# Patient Record
Sex: MALE | Race: WHITE | NOT HISPANIC OR LATINO | Employment: FULL TIME | URBAN - METROPOLITAN AREA
[De-identification: names, ages, dates, MRNs, and addresses within clinical notes are randomized per-mention and may not be internally consistent; named-entity substitution may affect disease eponyms.]

---

## 2017-10-29 ENCOUNTER — OFFICE VISIT (OUTPATIENT)
Dept: URGENT CARE | Facility: CLINIC | Age: 27
End: 2017-10-29
Payer: COMMERCIAL

## 2017-10-29 DIAGNOSIS — J01.90 ACUTE SINUSITIS: ICD-10-CM

## 2017-10-29 DIAGNOSIS — R30.0 DYSURIA: ICD-10-CM

## 2017-10-29 PROCEDURE — G0382 LEV 3 HOSP TYPE B ED VISIT: HCPCS

## 2018-01-18 NOTE — PROGRESS NOTES
Assessment    1  Acute sinusitis (461 9) (J01 90)    Plan  Acute sinusitis    · Amoxicillin-Pot Clavulanate 875-125 MG Oral Tablet; TAKE 1 TABLET EVERY 12  HOURS UNTIL GONE   · Lidocaine Viscous 2 % Mouth/Throat Solution; USE 5ML EVERY 2 HOURS AS  NEEDED    Discussion/Summary  Discussion Summary:   1) frequent nasal saline rinses  2) take antibiotic as prescribe  3) lidocaine rinse for sore throat as prescribed  Chief Complaint    1  Cold Symptoms    History of Present Illness  HPI: 32yo M p/w nasal congestion, facial pain, sore throat, and cough x 1 week  Pt denies n/v/d, sob/wheezing, fever/chills  Review of Systems  Focused-Male:   Constitutional: no fever or chills, feels well, no tiredness, no recent weight loss or weight gain  ENT: sore throat and nasal discharge, but no earache, no nosebleeds, no hearing loss and no hoarseness  Cardiovascular: no complaints of slow or fast heart rate, no chest pain, no palpitations, no leg claudication or lower extremity edema  Respiratory: cough, but no shortness of breath, no orthopnea, no wheezing, no shortness of breath during exertion and no PND  Gastrointestinal: no complaints of abdominal pain, no constipation, no nausea or vomiting, no diarrhea or bloody stools  Genitourinary: no complaints of dysuria or incontinence, no hesitancy, no nocturia, no genital lesion, no inadequacy of penile erection  Musculoskeletal: no complaints of arthralgia, no myalgia, no joint swelling or stiffness, no limb pain or swelling  Integumentary: no complaints of skin rash or lesion, no itching or dry skin, no skin wounds  Neurological: no complaints of headache, no confusion, no numbness or tingling, no dizziness or fainting  ROS Reviewed:   ROS reviewed  Active Problems    1  Acute sinusitis (461 9) (J01 90)    Past Medical History    1   History of gastroesophageal reflux (GERD) (V12 79) (Z87 19)  Active Problems And Past Medical History Reviewed: The active problems and past medical history were reviewed and updated today  Family History  Family History Reviewed: The family history was reviewed and updated today  Social History  Social History Reviewed: The social history was reviewed and is unchanged  Surgical History  Surgical History Reviewed: The surgical history was reviewed and updated today  Current Meds   1  NexIUM 20 MG Oral Capsule Delayed Release; Therapy: ([id number]) to Recorded  Medication List Reviewed: The medication list was reviewed and updated today  Allergies    1  No Known Drug Allergies    Vitals  Signs   Recorded: [id number] 08:24AM   Temperature: 97 5 F, Tympanic  Heart Rate: 62  Respiration: 20  Systolic: 569  Diastolic: 71  Height: 5 ft 7 in  Weight: 250 lb   BMI Calculated: 39 16  BSA Calculated: 2 22  O2 Saturation: 95  Pain Scale: 7    Physical Exam    Constitutional   General appearance: No acute distress, well appearing and well nourished  Eyes   Conjunctiva and lids: No swelling, erythema, or discharge  Pupils and irises: Equal, round and reactive to light  Ears, Nose, Mouth, and Throat   External inspection of ears and nose: Normal     Otoscopic examination: Tympanic membrance translucent with normal light reflex  Canals patent without erythema  Nasal mucosa, septum, and turbinates: Abnormal   normal nasal septum, no intranasal masses or polyps and normal nasal turbinates  There was a purulent discharge from both nares  The bilateral nasal mucosa was edematous  Oropharynx: Normal with no erythema, edema, exudate or lesions  Pulmonary   Respiratory effort: Abnormal   Respiratory rate: normal  Assessment of respiratory effort revealed normal rhythm and effort  Respiratory Findings: dry cough  Auscultation of lungs: Clear to auscultation  no rales or crackles were heard bilaterally  no rhonchi  no friction rub  no wheezing  no diminished breath sounds  Cardiovascular   Palpation of heart: Normal PMI, no thrills  Auscultation of heart: Normal rate and rhythm, normal S1 and S2, without murmurs  Abdomen   Abdomen: Non-tender, no masses  Liver and spleen: No hepatomegaly or splenomegaly  Lymphatic   Palpation of lymph nodes in neck: Abnormal   bilateral anterior cervical node enlargement, but no posterior cervical node enlargement  Skin   Skin and subcutaneous tissue: Normal without rashes or lesions      Psychiatric   Orientation to person, place and time: Normal     Mood and affect: Normal        Signatures   Electronically signed by : ELIZABETH Maier; Oct 29 2017  8:39AM EST                       (Author)    Electronically signed by : THERESA Connor ; Oct 29 2017  8:49AM EST                       (Co-author)

## 2020-10-27 ENCOUNTER — NURSE TRIAGE (OUTPATIENT)
Dept: OTHER | Facility: OTHER | Age: 30
End: 2020-10-27

## 2020-10-27 DIAGNOSIS — Z20.828 SARS-ASSOCIATED CORONAVIRUS EXPOSURE: Primary | ICD-10-CM

## 2020-10-28 DIAGNOSIS — Z20.828 SARS-ASSOCIATED CORONAVIRUS EXPOSURE: ICD-10-CM

## 2020-10-28 PROCEDURE — U0003 INFECTIOUS AGENT DETECTION BY NUCLEIC ACID (DNA OR RNA); SEVERE ACUTE RESPIRATORY SYNDROME CORONAVIRUS 2 (SARS-COV-2) (CORONAVIRUS DISEASE [COVID-19]), AMPLIFIED PROBE TECHNIQUE, MAKING USE OF HIGH THROUGHPUT TECHNOLOGIES AS DESCRIBED BY CMS-2020-01-R: HCPCS | Performed by: FAMILY MEDICINE

## 2020-10-29 LAB — SARS-COV-2 RNA SPEC QL NAA+PROBE: NOT DETECTED

## 2021-05-04 ENCOUNTER — DOCTOR'S OFFICE (OUTPATIENT)
Dept: URBAN - NONMETROPOLITAN AREA CLINIC 1 | Facility: CLINIC | Age: 31
Setting detail: OPHTHALMOLOGY
End: 2021-05-04

## 2021-05-04 DIAGNOSIS — H52.03: ICD-10-CM

## 2021-05-04 DIAGNOSIS — G43.109: ICD-10-CM

## 2021-05-04 DIAGNOSIS — H43.393: ICD-10-CM

## 2021-05-04 DIAGNOSIS — H52.223: ICD-10-CM

## 2021-05-04 PROCEDURE — SELFPAYVIS VISION VISIT SELF PAY: Performed by: OPTOMETRIST

## 2021-05-04 ASSESSMENT — TONOMETRY
OS_IOP_MMHG: 17
OD_IOP_MMHG: 17

## 2021-05-04 ASSESSMENT — CONFRONTATIONAL VISUAL FIELD TEST (CVF)
OS_FINDINGS: FULL
OD_FINDINGS: FULL

## 2021-05-04 ASSESSMENT — VISUAL ACUITY
OS_BCVA: 20/25
OD_BCVA: 20/25-1

## 2021-05-04 ASSESSMENT — REFRACTION_MANIFEST
OD_CYLINDER: -0.50
OS_AXIS: 165
OS_VA1: 20/20
OD_SPHERE: PLANO
OS_VA2: 20/20
OD_AXIS: 005
OD_VA2: 20/20
OS_CYLINDER: -0.50
OS_SPHERE: PLANO
OD_VA1: 20/20

## 2021-05-04 ASSESSMENT — REFRACTION_AUTOREFRACTION
OS_CYLINDER: -0.50
OD_SPHERE: 0.00
OD_AXIS: 005
OS_AXIS: 166
OD_CYLINDER: -0.25
OS_SPHERE: +0.50

## 2021-05-04 ASSESSMENT — SPHEQUIV_DERIVED
OS_SPHEQUIV: 0.25
OD_SPHEQUIV: -0.125

## 2022-11-08 ENCOUNTER — DOCTOR'S OFFICE (OUTPATIENT)
Dept: URBAN - NONMETROPOLITAN AREA CLINIC 1 | Facility: CLINIC | Age: 32
Setting detail: OPHTHALMOLOGY
End: 2022-11-08
Payer: COMMERCIAL

## 2022-11-08 ENCOUNTER — OPTICAL OFFICE (OUTPATIENT)
Dept: URBAN - NONMETROPOLITAN AREA CLINIC 4 | Facility: CLINIC | Age: 32
Setting detail: OPHTHALMOLOGY
End: 2022-11-08
Payer: COMMERCIAL

## 2022-11-08 DIAGNOSIS — H52.223: ICD-10-CM

## 2022-11-08 PROCEDURE — V2020 VISION SVCS FRAMES PURCHASES: HCPCS | Performed by: OPTOMETRIST

## 2022-11-08 PROCEDURE — V2103 SPHEROCYLINDR 4.00D/12-2.00D: HCPCS | Performed by: OPTOMETRIST

## 2022-11-08 PROCEDURE — 92014 COMPRE OPH EXAM EST PT 1/>: CPT | Performed by: OPTOMETRIST

## 2022-11-08 ASSESSMENT — REFRACTION_MANIFEST
OS_VA1: 20/20
OD_VA2: 20/20
OS_VA2: 20/20
OS_SPHERE: PLANO
OD_VA1: 20/20
OD_SPHERE: -0.25
OD_CYLINDER: -0.75
OS_CYLINDER: -0.50
OS_AXIS: 165
OD_AXIS: 005

## 2022-11-08 ASSESSMENT — REFRACTION_AUTOREFRACTION
OS_CYLINDER: -0.50
OS_AXIS: 169
OD_SPHERE: -0.50
OS_SPHERE: -0.25
OD_CYLINDER: -1.00
OD_AXIS: 178

## 2022-11-08 ASSESSMENT — TONOMETRY
OD_IOP_MMHG: 16
OS_IOP_MMHG: 16

## 2022-11-08 ASSESSMENT — SPHEQUIV_DERIVED
OD_SPHEQUIV: -0.625
OD_SPHEQUIV: -1
OS_SPHEQUIV: -0.5

## 2022-11-08 ASSESSMENT — CONFRONTATIONAL VISUAL FIELD TEST (CVF)
OD_FINDINGS: FULL
OS_FINDINGS: FULL

## 2022-11-08 ASSESSMENT — VISUAL ACUITY
OD_BCVA: 20/20
OS_BCVA: 20/20-1

## 2025-02-10 ENCOUNTER — OFFICE VISIT (OUTPATIENT)
Dept: URGENT CARE | Facility: CLINIC | Age: 35
End: 2025-02-10

## 2025-02-10 VITALS
HEIGHT: 67 IN | WEIGHT: 206.6 LBS | BODY MASS INDEX: 32.43 KG/M2 | OXYGEN SATURATION: 98 % | HEART RATE: 83 BPM | RESPIRATION RATE: 16 BRPM | SYSTOLIC BLOOD PRESSURE: 129 MMHG | TEMPERATURE: 97.8 F | DIASTOLIC BLOOD PRESSURE: 84 MMHG

## 2025-02-10 DIAGNOSIS — R11.0 NAUSEA: Primary | ICD-10-CM

## 2025-02-10 DIAGNOSIS — R19.7 DIARRHEA, UNSPECIFIED TYPE: ICD-10-CM

## 2025-02-10 PROCEDURE — 99213 OFFICE O/P EST LOW 20 MIN: CPT

## 2025-02-10 RX ORDER — ONDANSETRON 4 MG/1
4 TABLET, ORALLY DISINTEGRATING ORAL ONCE
Status: COMPLETED | OUTPATIENT
Start: 2025-02-10 | End: 2025-02-10

## 2025-02-10 RX ORDER — ONDANSETRON 4 MG/1
4 TABLET, FILM COATED ORAL EVERY 8 HOURS PRN
Qty: 20 TABLET | Refills: 0 | Status: SHIPPED | OUTPATIENT
Start: 2025-02-10

## 2025-02-10 RX ADMIN — ONDANSETRON 4 MG: 4 TABLET, ORALLY DISINTEGRATING ORAL at 15:46

## 2025-02-10 NOTE — PROGRESS NOTES
"Name: Federico Li III      : 1990      MRN: [id number]  Encounter Provider: Hazel Bowles PA-C  Encounter Date: 2/10/2025   Encounter department: [facility]  :  Assessment & Plan  Nausea    Orders:    ondansetron (ZOFRAN-ODT) dispersible tablet 4 mg    ondansetron (ZOFRAN) 4 mg tablet; Take 1 tablet (4 mg total) by mouth every 8 (eight) hours as needed for nausea or vomiting    Diarrhea, unspecified type    Orders:    ondansetron (ZOFRAN) 4 mg tablet; Take 1 tablet (4 mg total) by mouth every 8 (eight) hours as needed for nausea or vomiting    Dose of zofran given at visit to control nausea during exam. Pt tolerated well and had significantly improved symptoms.  Sent zofran script for at home as well to assist with keeping bland diet and fluids down. Advised to continue fluids with electrolytes.  Defers covid/flu testing.  Pt understands return precautions for worsening or persistent symptoms.    History of Present Illness   HPI  Federico Li III is a 34 y.o. male who presents with vomiting, fever, body aches, weakness and diarrhea started Saturday           Review of Systems   Constitutional:  Negative for fever.   Gastrointestinal:  Positive for diarrhea, nausea and vomiting.   Musculoskeletal:  Positive for arthralgias and myalgias.          Objective   /84   Pulse 83   Temp 97.8 °F (36.6 °C)   Resp 16   Ht 5' 7\" (1.702 m)   Wt 93.7 kg (206 lb 9.6 oz)   SpO2 98%   BMI 32.36 kg/m²      Physical Exam  Constitutional:       Appearance: He is ill-appearing.   HENT:      Head: Normocephalic and atraumatic.   Cardiovascular:      Rate and Rhythm: Normal rate.   Pulmonary:      Effort: Pulmonary effort is normal.   Abdominal:      General: Abdomen is flat.      Palpations: Abdomen is soft.      Tenderness: There is abdominal tenderness.      Comments: Tenderness in the epigastric area. No tenderness in the lower quadrants. Increased bowel sounds.   Neurological:    "   Mental Status: He is alert.

## 2025-02-10 NOTE — LETTER
February 10, 2025     Patient: Federico Li III   YOB: 1990   Date of Visit: 2/10/2025       To Whom it May Concern:    Federico Li was seen in my clinic on 2/10/2025. He may return to work on 2/12/25 .    If you have any questions or concerns, please don't hesitate to call.         Sincerely,          Hazel Bowles PA-C        CC: No Recipients

## 2025-02-11 ENCOUNTER — HOSPITAL ENCOUNTER (EMERGENCY)
Facility: HOSPITAL | Age: 35
Discharge: HOME | End: 2025-02-11
Attending: EMERGENCY MEDICINE
Payer: COMMERCIAL

## 2025-02-11 ENCOUNTER — APPOINTMENT (EMERGENCY)
Dept: RADIOLOGY | Facility: HOSPITAL | Age: 35
End: 2025-02-11
Payer: COMMERCIAL

## 2025-02-11 VITALS
OXYGEN SATURATION: 96 % | DIASTOLIC BLOOD PRESSURE: 80 MMHG | RESPIRATION RATE: 22 BRPM | SYSTOLIC BLOOD PRESSURE: 123 MMHG | HEART RATE: 58 BPM | TEMPERATURE: 98.6 F

## 2025-02-11 DIAGNOSIS — R07.9 CHEST PAIN: Primary | ICD-10-CM

## 2025-02-11 LAB
ALBUMIN SERPL BCG-MCNC: 4.4 G/DL (ref 3.5–5)
ALP SERPL-CCNC: 56 U/L (ref 34–104)
ALT SERPL W P-5'-P-CCNC: 32 U/L (ref 7–52)
ANION GAP SERPL CALCULATED.3IONS-SCNC: 6 MMOL/L (ref 4–13)
AST SERPL W P-5'-P-CCNC: 37 U/L (ref 13–39)
ATRIAL RATE: 66 BPM
BASOPHILS # BLD AUTO: 0.03 THOUSANDS/ΜL (ref 0–0.1)
BASOPHILS NFR BLD AUTO: 0 % (ref 0–1)
BILIRUB SERPL-MCNC: 0.62 MG/DL (ref 0.2–1)
BUN SERPL-MCNC: 15 MG/DL (ref 5–25)
CALCIUM SERPL-MCNC: 9.2 MG/DL (ref 8.4–10.2)
CARDIAC TROPONIN I PNL SERPL HS: 5 NG/L (ref ?–50)
CHLORIDE SERPL-SCNC: 101 MMOL/L (ref 96–108)
CO2 SERPL-SCNC: 29 MMOL/L (ref 21–32)
CREAT SERPL-MCNC: 1.24 MG/DL (ref 0.6–1.3)
EOSINOPHIL # BLD AUTO: 0.19 THOUSAND/ΜL (ref 0–0.61)
EOSINOPHIL NFR BLD AUTO: 2 % (ref 0–6)
ERYTHROCYTE [DISTWIDTH] IN BLOOD BY AUTOMATED COUNT: 12.1 % (ref 11.6–15.1)
GFR SERPL CREATININE-BSD FRML MDRD: 74 ML/MIN/1.73SQ M
GLUCOSE SERPL-MCNC: 77 MG/DL (ref 65–140)
HCT VFR BLD AUTO: 47.3 % (ref 36.5–49.3)
HGB BLD-MCNC: 16.4 G/DL (ref 12–17)
IMM GRANULOCYTES # BLD AUTO: 0.02 THOUSAND/UL (ref 0–0.2)
IMM GRANULOCYTES NFR BLD AUTO: 0 % (ref 0–2)
LYMPHOCYTES # BLD AUTO: 2.59 THOUSANDS/ΜL (ref 0.6–4.47)
LYMPHOCYTES NFR BLD AUTO: 31 % (ref 14–44)
MCH RBC QN AUTO: 28.9 PG (ref 26.8–34.3)
MCHC RBC AUTO-ENTMCNC: 34.7 G/DL (ref 31.4–37.4)
MCV RBC AUTO: 83 FL (ref 82–98)
MONOCYTES # BLD AUTO: 0.96 THOUSAND/ΜL (ref 0.17–1.22)
MONOCYTES NFR BLD AUTO: 11 % (ref 4–12)
NEUTROPHILS # BLD AUTO: 4.68 THOUSANDS/ΜL (ref 1.85–7.62)
NEUTS SEG NFR BLD AUTO: 56 % (ref 43–75)
NRBC BLD AUTO-RTO: 0 /100 WBCS
P AXIS: 51 DEGREES
PLATELET # BLD AUTO: 262 THOUSANDS/UL (ref 149–390)
PMV BLD AUTO: 10 FL (ref 8.9–12.7)
POTASSIUM SERPL-SCNC: 3.3 MMOL/L (ref 3.5–5.3)
PR INTERVAL: 152 MS
PROT SERPL-MCNC: 7.4 G/DL (ref 6.4–8.4)
QRS AXIS: 75 DEGREES
QRSD INTERVAL: 94 MS
QT INTERVAL: 392 MS
QTC INTERVAL: 410 MS
RBC # BLD AUTO: 5.67 MILLION/UL (ref 3.88–5.62)
SODIUM SERPL-SCNC: 136 MMOL/L (ref 135–147)
T WAVE AXIS: 56 DEGREES
VENTRICULAR RATE: 66 BPM
WBC # BLD AUTO: 8.47 THOUSAND/UL (ref 4.31–10.16)

## 2025-02-11 PROCEDURE — 99284 EMERGENCY DEPT VISIT MOD MDM: CPT | Performed by: EMERGENCY MEDICINE

## 2025-02-11 PROCEDURE — 84484 ASSAY OF TROPONIN QUANT: CPT | Performed by: EMERGENCY MEDICINE

## 2025-02-11 PROCEDURE — 93005 ELECTROCARDIOGRAM TRACING: CPT

## 2025-02-11 PROCEDURE — 36415 COLL VENOUS BLD VENIPUNCTURE: CPT | Performed by: EMERGENCY MEDICINE

## 2025-02-11 PROCEDURE — 80053 COMPREHEN METABOLIC PANEL: CPT | Performed by: EMERGENCY MEDICINE

## 2025-02-11 PROCEDURE — 71045 X-RAY EXAM CHEST 1 VIEW: CPT

## 2025-02-11 PROCEDURE — 93010 ELECTROCARDIOGRAM REPORT: CPT | Performed by: INTERNAL MEDICINE

## 2025-02-11 PROCEDURE — 96374 THER/PROPH/DIAG INJ IV PUSH: CPT

## 2025-02-11 PROCEDURE — 99285 EMERGENCY DEPT VISIT HI MDM: CPT

## 2025-02-11 PROCEDURE — 85025 COMPLETE CBC W/AUTO DIFF WBC: CPT | Performed by: EMERGENCY MEDICINE

## 2025-02-11 RX ORDER — POTASSIUM CHLORIDE 1500 MG/1
40 TABLET, EXTENDED RELEASE ORAL ONCE
Status: COMPLETED | OUTPATIENT
Start: 2025-02-11 | End: 2025-02-11

## 2025-02-11 RX ORDER — FAMOTIDINE 10 MG/ML
20 INJECTION, SOLUTION INTRAVENOUS ONCE
Status: COMPLETED | OUTPATIENT
Start: 2025-02-11 | End: 2025-02-11

## 2025-02-11 RX ADMIN — FAMOTIDINE 20 MG: 10 INJECTION, SOLUTION INTRAVENOUS at 14:43

## 2025-02-11 RX ADMIN — POTASSIUM CHLORIDE 40 MEQ: 1500 TABLET, EXTENDED RELEASE ORAL at 15:28

## 2025-02-11 NOTE — Clinical Note
Federico Li was seen and treated in our emergency department on 2/11/2025.                Diagnosis:     Federico  may return to work on return date.    He may return on this date: 02/12/2025         If you have any questions or concerns, please don't hesitate to call.      Robert Walker MD    ______________________________           _______________          _______________  Hospital Representative                              Date                                Time

## 2025-02-11 NOTE — ED PROVIDER NOTES
Time reflects when diagnosis was documented in both MDM as applicable and the Disposition within this note       Time User Action Codes Description Comment    2/11/2025  3:23 PM Robert Walker Add [R07.9] Chest pain           ED Disposition       ED Disposition   Discharge    Condition   Stable    Date/Time   Tue Feb 11, 2025  3:23 PM    Comment   Federico Li III discharge to home/self care.                   Assessment & Plan       Medical Decision Making  1432: Patient appears well, vital signs reviewed.   Pt states he just wanted to get a work note from the , but is agreeable to emergent evaluation.  Placed on monitor.  Patient with atypical chest pain, relates it to be possible esophagitis.  Patient sent from urgent care for further evaluation.  Plan to complete basic labs including cardiac enzymes.  Check chest x-ray.  I will give Pepcid for his discomfort and reevaluate.     1524: Chest x-ray and labs reviewed.  Patient has remained stable throughout ED course.  Stable for discharge.    Amount and/or Complexity of Data Reviewed  Labs: ordered.  Radiology: ordered.     Details: Chest x-ray--no acute pathology  ECG/medicine tests: ordered and independent interpretation performed.     Details: Normal sinus rhythm 66 bpm, no acute ischemia.    Risk  Prescription drug management.             Medications   potassium chloride (Klor-Con M20) CR tablet 40 mEq (has no administration in time range)   Famotidine (PF) (PEPCID) injection 20 mg (20 mg Intravenous Given 2/11/25 1443)       ED Risk Strat Scores      HEART Risk Score      Flowsheet Row Most Recent Value   Heart Score Risk Calculator    History 0 Filed at: 02/11/2025 1522   ECG 0 Filed at: 02/11/2025 1522   Age 0 Filed at: 02/11/2025 1522   Risk Factors 0 Filed at: 02/11/2025 1522   Troponin 0 Filed at: 02/11/2025 1522   HEART Score 0 Filed at: 02/11/2025 1522                            SBIRT 22yo+      Flowsheet Row Most Recent Value   Initial Alcohol  Screen: US AUDIT-C     1. How often do you have a drink containing alcohol? 0 Filed at: 02/11/2025 1451   2. How many drinks containing alcohol do you have on a typical day you are drinking?  0 Filed at: 02/11/2025 1451   3a. Male UNDER 65: How often do you have five or more drinks on one occasion? 0 Filed at: 02/11/2025 1451   3b. FEMALE Any Age, or MALE 65+: How often do you have 4 or more drinks on one occassion? 0 Filed at: 02/11/2025 1451   Audit-C Score 0 Filed at: 02/11/2025 1451   CHRISTIANE: How many times in the past year have you...    Used an illegal drug or used a prescription medication for non-medical reasons? Never Filed at: 02/11/2025 1451                            History of Present Illness       Chief Complaint   Patient presents with    Shortness of Breath     Pt c/o heartburn with SOB and a tightness in his esophagus. Reports recent dx of norovirus       History reviewed. No pertinent past medical history.   History reviewed. No pertinent surgical history.   Family History   Problem Relation Age of Onset    No Known Problems Mother     Heart attack Father       Social History     Tobacco Use    Smoking status: Never    Smokeless tobacco: Never   Vaping Use    Vaping status: Every Day    Substances: Nicotine, Flavoring   Substance Use Topics    Alcohol use: Not Currently    Drug use: Not Currently      E-Cigarette/Vaping    E-Cigarette Use Current Every Day User       E-Cigarette/Vaping Substances    Nicotine Yes     THC No     CBD No     Flavoring Yes       I have reviewed and agree with the history as documented.       History provided by:  Medical records and patient  Chest Pain  Pain location:  Substernal area  Pain quality: burning    Pain radiates to:  Does not radiate  Pain radiates to the back: no    Pain severity:  Moderate  Onset quality:  Gradual  Duration:  1 day  Timing:  Intermittent  Progression:  Waxing and waning  Chronicity:  New  Context comment:  History of GERD, recently diagnosed  with norovirus, had nausea vomiting diarrhea 5 days ago, slowly improved.  Since last night has had some increased belching and chest burning  Relieved by:  Nothing  Worsened by:  Nothing tried  Ineffective treatments:  None tried  Associated symptoms: nausea and vomiting    Associated symptoms: no abdominal pain, no altered mental status, no anorexia, no anxiety, no back pain, no claudication, no cough, no diaphoresis, no dizziness, no dysphagia, no fatigue, no fever, no headache, no heartburn, no lower extremity edema, no near-syncope, no numbness, no orthopnea, no palpitations, no PND, no shortness of breath, no syncope and no weakness    Risk factors: no prior DVT/PE        Review of Systems   Constitutional:  Negative for appetite change, chills, diaphoresis, fatigue and fever.   HENT:  Negative for ear pain, rhinorrhea, sore throat and trouble swallowing.    Eyes:  Negative for pain, discharge and visual disturbance.   Respiratory:  Negative for cough, chest tightness and shortness of breath.    Cardiovascular:  Positive for chest pain. Negative for palpitations, orthopnea, claudication, syncope, PND and near-syncope.   Gastrointestinal:  Positive for diarrhea, nausea and vomiting. Negative for abdominal pain, anorexia and heartburn.   Endocrine: Negative for polydipsia, polyphagia and polyuria.   Genitourinary:  Negative for difficulty urinating, dysuria, hematuria and testicular pain.   Musculoskeletal:  Negative for arthralgias and back pain.   Skin:  Negative for color change and rash.   Allergic/Immunologic: Negative for immunocompromised state.   Neurological:  Negative for dizziness, seizures, syncope, weakness, numbness and headaches.   Hematological:  Negative for adenopathy.   Psychiatric/Behavioral:  Negative for confusion and dysphoric mood.    All other systems reviewed and are negative.          Objective       ED Triage Vitals [02/11/25 1440]   Temperature Pulse Blood Pressure Respirations SpO2  Patient Position - Orthostatic VS   98.6 °F (37 °C) 65 (!) 158/106 20 99 % --      Temp Source Heart Rate Source BP Location FiO2 (%) Pain Score    Temporal Monitor Right arm -- --      Vitals      Date and Time Temp Pulse SpO2 Resp BP Pain Score FACES Pain Rating User   02/11/25 1500 -- 60 97 % 21 132/78 -- -- SS   02/11/25 1445 -- 58 96 % 19 132/77 -- -- SS   02/11/25 1440 98.6 °F (37 °C) 65 99 % 20 158/106 -- -- SS            Physical Exam  Vitals and nursing note reviewed.   Constitutional:       General: He is not in acute distress.     Appearance: Normal appearance. He is not ill-appearing, toxic-appearing or diaphoretic.   HENT:      Head: Normocephalic and atraumatic.      Nose: Nose normal. No congestion or rhinorrhea.      Mouth/Throat:      Mouth: Mucous membranes are moist.      Pharynx: Oropharynx is clear. No oropharyngeal exudate or posterior oropharyngeal erythema.   Eyes:      General:         Right eye: No discharge.         Left eye: No discharge.   Cardiovascular:      Rate and Rhythm: Normal rate and regular rhythm.      Pulses: Normal pulses.      Heart sounds: Normal heart sounds. No murmur heard.     No gallop.   Pulmonary:      Effort: Pulmonary effort is normal. No respiratory distress.      Breath sounds: Normal breath sounds. No stridor. No wheezing, rhonchi or rales.   Chest:      Chest wall: No tenderness.   Abdominal:      General: Bowel sounds are normal. There is no distension.      Palpations: Abdomen is soft. There is no mass.      Tenderness: There is no abdominal tenderness. There is no right CVA tenderness, left CVA tenderness, guarding or rebound.      Hernia: No hernia is present.   Musculoskeletal:         General: Normal range of motion.      Cervical back: Normal range of motion and neck supple.   Skin:     General: Skin is warm and dry.      Capillary Refill: Capillary refill takes less than 2 seconds.   Neurological:      General: No focal deficit present.      Mental  Status: He is alert and oriented to person, place, and time.      Cranial Nerves: No cranial nerve deficit.      Sensory: No sensory deficit.      Motor: No weakness.      Coordination: Coordination normal.      Gait: Gait normal.      Deep Tendon Reflexes: Reflexes normal.   Psychiatric:         Mood and Affect: Mood normal.         Behavior: Behavior normal.         Thought Content: Thought content normal.         Judgment: Judgment normal.         Results Reviewed       Procedure Component Value Units Date/Time    HS Troponin 0hr (reflex protocol) [[id number]]  (Normal) Collected: 02/11/25 1441    Lab Status: Final result Specimen: Blood from Arm, Right Updated: 02/11/25 1515     hs TnI 0hr 5 ng/L     Comprehensive metabolic panel [[id number]]  (Abnormal) Collected: 02/11/25 1441    Lab Status: Final result Specimen: Blood from Arm, Right Updated: 02/11/25 1509     Sodium 136 mmol/L      Potassium 3.3 mmol/L      Chloride 101 mmol/L      CO2 29 mmol/L      ANION GAP 6 mmol/L      BUN 15 mg/dL      Creatinine 1.24 mg/dL      Glucose 77 mg/dL      Calcium 9.2 mg/dL      AST 37 U/L      ALT 32 U/L      Alkaline Phosphatase 56 U/L      Total Protein 7.4 g/dL      Albumin 4.4 g/dL      Total Bilirubin 0.62 mg/dL      eGFR 74 ml/min/1.73sq m     Narrative:      National Kid[employer] Foundation guidelines for Chronic Kidney Disease (CKD):     Stage 1 with normal or high GFR (GFR > 90 mL/min/1.73 square meters)    Stage 2 Mild CKD (GFR = 60-89 mL/min/1.73 square meters)    Stage 3A Moderate CKD (GFR = 45-59 mL/min/1.73 square meters)    Stage 3B Moderate CKD (GFR = 30-44 mL/min/1.73 square meters)    Stage 4 Severe CKD (GFR = 15-29 mL/min/1.73 square meters)    Stage 5 End Stage CKD (GFR <15 mL/min/1.73 square meters)  Note: GFR calculation is accurate only with a steady state creatinine    CBC and differential [[id number]]  (Abnormal) Collected: 02/11/25 1441    Lab Status: Final result Specimen: Blood from Arm, Right  Updated: 02/11/25 1445     WBC 8.47 Thousand/uL      RBC 5.67 Million/uL      Hemoglobin 16.4 g/dL      Hematocrit 47.3 %      MCV 83 fL      MCH 28.9 pg      MCHC 34.7 g/dL      RDW 12.1 %      MPV 10.0 fL      Platelets 262 Thousands/uL      nRBC 0 /100 WBCs      Segmented % 56 %      Immature Grans % 0 %      Lymphocytes % 31 %      Monocytes % 11 %      Eosinophils Relative 2 %      Basophils Relative 0 %      Absolute Neutrophils 4.68 Thousands/µL      Absolute Immature Grans 0.02 Thousand/uL      Absolute Lymphocytes 2.59 Thousands/µL      Absolute Monocytes 0.96 Thousand/µL      Eosinophils Absolute 0.19 Thousand/µL      Basophils Absolute 0.03 Thousands/µL             XR chest 1 view portable    (Results Pending)       Procedures    ED Medication and Procedure Management   Prior to Admission Medications   Prescriptions Last Dose Informant Patient Reported? Taking?   omeprazole (PriLOSEC) 20 mg delayed release capsule   Yes No   Sig: Take 20 mg by mouth daily   ondansetron (ZOFRAN) 4 mg tablet   No No   Sig: Take 1 tablet (4 mg total) by mouth every 8 (eight) hours as needed for nausea or vomiting      Facility-Administered Medications Last Administration Doses Remaining   ondansetron (ZOFRAN-ODT) dispersible tablet 4 mg 2/10/2025  3:46 PM 0        Patient's Medications   Discharge Prescriptions    No medications on file     No discharge procedures on file.  ED SEPSIS DOCUMENTATION   Time reflects when diagnosis was documented in both MDM as applicable and the Disposition within this note       Time User Action Codes Description Comment    2/11/2025  3:23 PM Robert Walker Add [R07.9] Chest pain                  Robert Walker MD  02/11/25 1170

## 2025-03-12 ENCOUNTER — OFFICE VISIT (OUTPATIENT)
Dept: URGENT CARE | Facility: CLINIC | Age: 35
End: 2025-03-12
Payer: COMMERCIAL

## 2025-03-12 VITALS
RESPIRATION RATE: 18 BRPM | BODY MASS INDEX: 33.12 KG/M2 | OXYGEN SATURATION: 98 % | HEIGHT: 67 IN | DIASTOLIC BLOOD PRESSURE: 79 MMHG | WEIGHT: 211 LBS | TEMPERATURE: 96.8 F | SYSTOLIC BLOOD PRESSURE: 117 MMHG | HEART RATE: 83 BPM

## 2025-03-12 DIAGNOSIS — K52.9 GASTROENTERITIS: Primary | ICD-10-CM

## 2025-03-12 PROCEDURE — S9083 URGENT CARE CENTER GLOBAL: HCPCS | Performed by: PHYSICIAN ASSISTANT

## 2025-03-12 PROCEDURE — G0382 LEV 3 HOSP TYPE B ED VISIT: HCPCS | Performed by: PHYSICIAN ASSISTANT

## 2025-03-12 NOTE — LETTER
March 12, 2025     Patient: Federico Li III   YOB: 1990   Date of Visit: 3/12/2025       To Whom It May Concern:    It is my medical opinion that Federico Li may return to work on 3/13/2025 .             Sincerely,        YVON JOHNS

## 2025-03-12 NOTE — LETTER
March 12, 2025     Patient: Federico Li III   YOB: 1990   Date of Visit: 3/12/2025       To Whom It May Concern:    It is my medical opinion that Federico Li may return to work on 3/13/2025. Please excuse form work on 3/11/2025-3/12/2025 .             Sincerely,        Shelley Bazan PA-C

## 2025-03-12 NOTE — PROGRESS NOTES
[facility]        NAME: Federico Li III is a 34 y.o. male  : 1990    MRN: [id number]  DATE: 2025  TIME: 10:22 AM    Assessment and Plan   Gastroenteritis [K52.9]  1. Gastroenteritis              Patient Instructions   Maintain hydration by drinking lots of fluids.  If feeling nauseous, take small steps every 5-10 minutes.  May supplement water with Pedialyte or watered down Gatorade.  Eat as tolerated.  Begin with clear liquids (Jell-O, water ice, broth, popsicles) and progress to BRAT diet (bananas, rice, applesauce and toast).  Advance diet as tolerated.  Use OTC Tylenol for fever.  You should begin to feel better and the next couple days.    Follow-up with your PCP in 3-5 days.  Go to the ER if you are unable to maintain hydration or symptoms become severe.      Follow up with PCP in 3-5 days.  Proceed to  ER if symptoms worsen.    If tests have been performed at [facility], our office will contact you with results if changes need to be made to the care plan discussed with you at the visit.  You can [facility] full [facility] on [facility]    Chief Complaint     Chief Complaint   Patient presents with    Vomiting     Vomiting and diarrhea x 1 day          History of Present Illness       Patient a 34-year-old male with no significant past medical history presents the office planing of nausea, vomiting, diarrhea since yesterday.  Still feels queasy but vomiting stopped.  Still having diarrhea.  No fevers.  No suspicious foods, antibiotics, recent travel.  Drinking fluids.        Review of Systems   Review of Systems   Constitutional:  Positive for fatigue. Negative for chills and fever.   HENT:  Negative for congestion and sore throat.    Respiratory:  Negative for cough and shortness of breath.    Cardiovascular:  Negative for chest pain and palpitations.   Gastrointestinal:  Positive for abdominal pain, diarrhea, nausea and vomiting.   Musculoskeletal:  Positive for  "myalgias.   Skin:  Negative for rash.   Neurological:  Positive for weakness. Negative for dizziness, light-headedness and headaches.         Current Medications       Current Outpatient Medications:     omeprazole (PriLOSEC) 20 mg delayed release capsule, Take 20 mg by mouth daily, Disp: , Rfl:     ondansetron (ZOFRAN) 4 mg tablet, Take 1 tablet (4 mg total) by mouth every 8 (eight) hours as needed for nausea or vomiting (Patient not taking: Reported on 3/12/2025), Disp: 20 tablet, Rfl: 0    Current Allergies     Allergies as of 03/12/2025 - Reviewed 03/12/2025   Allergen Reaction Noted    Pollen extract Sneezing 09/14/2021            The following portions of the patient's history were reviewed and updated as appropriate: allergies, current medications, past family history, past medical history, past social history, past surgical history and problem list.     History reviewed. No pertinent past medical history.    History reviewed. No pertinent surgical history.    Family History   Problem Relation Age of Onset    No Known Problems Mother     Heart attack Father          Medications have been verified.        Objective   /79   Pulse 83   Temp (!) 96.8 °F (36 °C) (Tympanic)   Resp 18   Ht 5' 7\" (1.702 m)   Wt 95.7 kg (211 lb)   SpO2 98%   BMI 33.05 kg/m²   No LMP for male patient.       Physical Exam     Physical Exam  Vitals and nursing note reviewed.   Constitutional:       Appearance: Normal appearance. He is well-developed.   HENT:      Head: Normocephalic and atraumatic.      Right Ear: Tympanic membrane, ear canal and external ear normal.      Left Ear: Tympanic membrane, ear canal and external ear normal.      Nose: Nose normal.      Mouth/Throat:      Pharynx: Uvula midline.   Eyes:      General: Lids are normal.      Conjunctiva/sclera: Conjunctivae normal.      Pupils: Pupils are equal, round, and reactive to light.   Cardiovascular:      Rate and Rhythm: Normal rate and regular rhythm.      " Heart sounds: Normal heart sounds. No murmur heard.     No friction rub. No gallop.   Pulmonary:      Effort: Pulmonary effort is normal.      Breath sounds: Normal breath sounds. No stridor. No wheezing or rales.   Abdominal:      General: Bowel sounds are normal.      Palpations: Abdomen is soft.      Tenderness: There is no abdominal tenderness.   Musculoskeletal:         General: Normal range of motion.      Cervical back: Neck supple.   Skin:     General: Skin is warm and dry.      Capillary Refill: Capillary refill takes less than 2 seconds.   Neurological:      Mental Status: He is alert.

## 2025-05-12 ENCOUNTER — OFFICE VISIT (OUTPATIENT)
Dept: URGENT CARE | Facility: CLINIC | Age: 35
End: 2025-05-12
Payer: COMMERCIAL

## 2025-05-12 VITALS
HEIGHT: 67 IN | WEIGHT: 212.8 LBS | TEMPERATURE: 97.8 F | HEART RATE: 81 BPM | OXYGEN SATURATION: 98 % | DIASTOLIC BLOOD PRESSURE: 86 MMHG | SYSTOLIC BLOOD PRESSURE: 124 MMHG | BODY MASS INDEX: 33.4 KG/M2 | RESPIRATION RATE: 18 BRPM

## 2025-05-12 DIAGNOSIS — J01.00 ACUTE MAXILLARY SINUSITIS, RECURRENCE NOT SPECIFIED: Primary | ICD-10-CM

## 2025-05-12 LAB
SARS-COV-2 AG UPPER RESP QL IA: NEGATIVE
VALID CONTROL: NORMAL

## 2025-05-12 PROCEDURE — S9083 URGENT CARE CENTER GLOBAL: HCPCS | Performed by: PHYSICIAN ASSISTANT

## 2025-05-12 PROCEDURE — G0382 LEV 3 HOSP TYPE B ED VISIT: HCPCS | Performed by: PHYSICIAN ASSISTANT

## 2025-05-12 PROCEDURE — 87811 SARS-COV-2 COVID19 W/OPTIC: CPT | Performed by: PHYSICIAN ASSISTANT

## 2025-05-12 RX ORDER — FLUTICASONE PROPIONATE 50 MCG
1 SPRAY, SUSPENSION (ML) NASAL DAILY
Qty: 9.9 ML | Refills: 0 | Status: SHIPPED | OUTPATIENT
Start: 2025-05-12

## 2025-05-12 RX ORDER — AMOXICILLIN 500 MG/1
500 CAPSULE ORAL EVERY 8 HOURS SCHEDULED
Qty: 30 CAPSULE | Refills: 0 | Status: SHIPPED | OUTPATIENT
Start: 2025-05-12 | End: 2025-05-22

## 2025-05-12 RX ORDER — ESOMEPRAZOLE MAGNESIUM 20 MG/1
TABLET, DELAYED RELEASE ORAL
COMMUNITY

## 2025-05-12 NOTE — PROGRESS NOTES
"[facility]        NAME: Federico Li III is a 34 y.o. male  : 1990    MRN: [id number]  DATE: May 12, 2025  TIME: 8:33 AM    /86   Pulse 81   Temp 97.8 °F (36.6 °C)   Resp 18   Ht 5' 7\" (1.702 m)   Wt 96.5 kg (212 lb 12.8 oz)   SpO2 98%   BMI 33.33 kg/m²     Assessment and Plan   Acute maxillary sinusitis, recurrence not specified [J01.00]  1. Acute maxillary sinusitis, recurrence not specified  Poct Covid 19 Rapid Antigen Test            Patient Instructions       Follow up with PCP in 3-5 days.  Proceed to  ER if symptoms worsen.    Chief Complaint     Chief Complaint   Patient presents with    Cold Like Symptoms     Headache, nausea, body aches, chills,  started Friday   Drinks 1000mg of caffeine a day, when he cutted down he started these symptoms          History of Present Illness       Pt with 3 days dx of congestion body aches frontal headache         Review of Systems   Review of Systems   Constitutional:  Positive for chills, fatigue and fever.   HENT:  Positive for rhinorrhea.    Eyes: Negative.    Respiratory: Negative.     Cardiovascular: Negative.    Gastrointestinal: Negative.    Endocrine: Negative.    Genitourinary: Negative.    Musculoskeletal:  Positive for myalgias.   Skin: Negative.    Allergic/Immunologic: Negative.    Neurological: Negative.    Hematological: Negative.    Psychiatric/Behavioral: Negative.     All other systems reviewed and are negative.        Current Medications       Current Outpatient Medications:     Esomeprazole Magnesium (NexIUM 24HR) 20 MG TBEC, Take by mouth, Disp: , Rfl:     omeprazole (PriLOSEC) 20 mg delayed release capsule, Take 20 mg by mouth daily (Patient not taking: Reported on 2025), Disp: , Rfl:     ondansetron (ZOFRAN) 4 mg tablet, Take 1 tablet (4 mg total) by mouth every 8 (eight) hours as needed for nausea or vomiting (Patient not taking: Reported on 2025), Disp: 20 tablet, Rfl: 0    Current Allergies " "    Allergies as of 05/12/2025 - Reviewed 05/12/2025   Allergen Reaction Noted    Pollen extract Sneezing 09/14/2021            The following portions of the patient's history were reviewed and updated as appropriate: allergies, current medications, past family history, past medical history, past social history, past surgical history and problem list.     History reviewed. No pertinent past medical history.    History reviewed. No pertinent surgical history.    Family History   Problem Relation Age of Onset    No Known Problems Mother     Heart attack Father          Medications have been verified.        Objective   /86   Pulse 81   Temp 97.8 °F (36.6 °C)   Resp 18   Ht 5' 7\" (1.702 m)   Wt 96.5 kg (212 lb 12.8 oz)   SpO2 98%   BMI 33.33 kg/m²        Physical Exam     Physical Exam  Vitals and nursing note reviewed.   Constitutional:       Appearance: Normal appearance. He is normal weight.   HENT:      Head: Normocephalic and atraumatic.      Right Ear: Tympanic membrane, ear canal and external ear normal.      Left Ear: Tympanic membrane, ear canal and external ear normal.      Nose: Congestion and rhinorrhea present.      Comments: Frontal sinus tender to palp      Mouth/Throat:      Mouth: Mucous membranes are moist.      Pharynx: Oropharynx is clear.   Cardiovascular:      Rate and Rhythm: Normal rate and regular rhythm.      Pulses: Normal pulses.      Heart sounds: Normal heart sounds.   Pulmonary:      Effort: Pulmonary effort is normal.      Breath sounds: Normal breath sounds.   Abdominal:      General: Bowel sounds are normal.      Palpations: Abdomen is soft.   Musculoskeletal:         General: Normal range of motion.      Cervical back: Normal range of motion and neck supple.   Skin:     General: Skin is warm.      Capillary Refill: Capillary refill takes less than 2 seconds.   Neurological:      Mental Status: He is alert and oriented to person, place, and time.                     "

## 2025-05-12 NOTE — LETTER
May 12, 2025     Patient: Federico Li III   YOB: 1990   Date of Visit: 5/12/2025       To Whom It May Concern:    It is my medical opinion that Federico Li may return to work on 5/13/2025 .    If you have any questions or concerns, please don't hesitate to call.         Sincerely,        Reno Bustamante Jr, JULIA    CC: No Recipients

## 2025-05-13 ENCOUNTER — HOSPITAL ENCOUNTER (EMERGENCY)
Facility: HOSPITAL | Age: 35
Discharge: HOME | End: 2025-05-13
Attending: STUDENT IN AN ORGANIZED HEALTH CARE EDUCATION/TRAINING PROGRAM
Payer: COMMERCIAL

## 2025-05-13 ENCOUNTER — APPOINTMENT (EMERGENCY)
Dept: RADIOLOGY | Facility: HOSPITAL | Age: 35
End: 2025-05-13
Payer: COMMERCIAL

## 2025-05-13 VITALS
HEART RATE: 75 BPM | SYSTOLIC BLOOD PRESSURE: 138 MMHG | DIASTOLIC BLOOD PRESSURE: 80 MMHG | OXYGEN SATURATION: 98 % | TEMPERATURE: 98.6 F | RESPIRATION RATE: 16 BRPM

## 2025-05-13 DIAGNOSIS — B34.9 VIRAL SYNDROME: Primary | ICD-10-CM

## 2025-05-13 LAB — S PYO DNA THROAT QL NAA+PROBE: NOT DETECTED

## 2025-05-13 PROCEDURE — 87651 STREP A DNA AMP PROBE: CPT | Performed by: STUDENT IN AN ORGANIZED HEALTH CARE EDUCATION/TRAINING PROGRAM

## 2025-05-13 PROCEDURE — 71045 X-RAY EXAM CHEST 1 VIEW: CPT

## 2025-05-13 PROCEDURE — 99284 EMERGENCY DEPT VISIT MOD MDM: CPT | Performed by: STUDENT IN AN ORGANIZED HEALTH CARE EDUCATION/TRAINING PROGRAM

## 2025-05-13 PROCEDURE — 99283 EMERGENCY DEPT VISIT LOW MDM: CPT

## 2025-05-13 NOTE — ED PROVIDER NOTES
Time reflects when diagnosis was documented in both MDM as applicable and the Disposition within this note       Time User Action Codes Description Comment    5/13/2025  9:45 AM Benjamin Museony Add [B34.9] Viral syndrome           ED Disposition       ED Disposition   Discharge    Condition   Stable    Date/Time   Tue May 13, 2025  9:45 AM    Comment   Federico Li III discharge to home/self care.                   Assessment & Plan       Medical Decision Making  34 M. Presents with intermittent fevers/chills, body aches, URI symptoms.  Vital signs reviewed.  No signs respiratory distress.  Not requiring supplemental oxygen.  Of note, the patient's daughter is ill with similar symptoms.  Given the patient's presentation, likely viral illness.  Strep PCR negative.  Chest x-ray without signs of focal infiltrate to suggest pneumonia. Bacterial sinusitis, PTA, RPA, unlikely. No other signs/symptoms to suggest to other infectious source. Recommendations/return precautions were discussed with the patient. Stable for discharge.     Problems Addressed:  Viral syndrome: acute illness or injury    Amount and/or Complexity of Data Reviewed  Labs: ordered.  Radiology: ordered and independent interpretation performed.     Details: CXR interpreted by me. No acute cardiopulmonary disease.       Medications - No data to display    ED Risk Strat Scores      No data recorded    SBIRT 20yo+      Flowsheet Row Most Recent Value   Initial Alcohol Screen: US AUDIT-C     1. How often do you have a drink containing alcohol? 0 Filed at: 05/13/2025 0701   2. How many drinks containing alcohol do you have on a typical day you are drinking?  0 Filed at: 05/13/2025 0701   3a. Male UNDER 65: How often do you have five or more drinks on one occasion? 0 Filed at: 05/13/2025 0701   Audit-C Score 0 Filed at: 05/13/2025 0701   CHRISTIANE: How many times in the past year have you...    Used an illegal drug or used a prescription medication for  non-medical reasons? Never Filed at: 05/13/2025 0701          History of Present Illness       Chief Complaint   Patient presents with    Headache     Patient reports headache, fever, chills, and lymph node swelling since Friday with rash on lower abdomen. States antwon has had shingles. Seen at  yesterday, covid negative       History reviewed. No pertinent past medical history.   History reviewed. No pertinent surgical history.   Family History   Problem Relation Age of Onset    No Known Problems Mother     Heart attack Father       Social History     Tobacco Use    Smoking status: Never    Smokeless tobacco: Never   Vaping Use    Vaping status: Every Day    Substances: Nicotine, Flavoring   Substance Use Topics    Alcohol use: Not Currently    Drug use: Not Currently      E-Cigarette/Vaping    E-Cigarette Use Current Every Day User       E-Cigarette/Vaping Substances    Nicotine Yes     THC No     CBD No     Flavoring Yes       I have reviewed and agree with the history as documented.     34 M. Presents with intermittent headaches/body aches/subjective fevers/neck discomfort x 4 days. Has been taking OTC medications with improvement of symptoms. Was evaluated at urgent care yesterday. Was prescribed amoxicillin and fluticasone. Appetite has been decreased. Expresses mild dry cough. Denies shortness of breath, chest pain. The patient's daughter is ill with similar symptoms.       History provided by:  Patient and medical records  URI  Presenting symptoms: cough, fatigue and fever    Presenting symptoms: no congestion and no rhinorrhea    Associated symptoms: arthralgias, headaches, myalgias, sinus pain and swollen glands    Associated symptoms: no neck pain, no sneezing and no wheezing    Risk factors: recent illness and sick contacts    Risk factors: no recent travel      Review of Systems   Constitutional:  Positive for activity change, appetite change, chills, fatigue and fever.   HENT:  Positive for sinus  pain. Negative for congestion, rhinorrhea, sinus pressure and sneezing.    Respiratory:  Positive for cough. Negative for chest tightness, shortness of breath and wheezing.    Cardiovascular:  Negative for chest pain.   Gastrointestinal:  Negative for abdominal pain, diarrhea, nausea and vomiting.   Musculoskeletal:  Positive for arthralgias and myalgias. Negative for back pain, neck pain and neck stiffness.   Neurological:  Positive for headaches. Negative for dizziness, syncope, weakness, light-headedness and numbness.     Objective       ED Triage Vitals   Temperature Pulse Blood Pressure Respirations SpO2 Patient Position - Orthostatic VS   05/13/25 0700 05/13/25 0700 05/13/25 0701 05/13/25 0700 05/13/25 0700 05/13/25 0700   98.6 °F (37 °C) 75 138/80 16 98 % Lying      Temp Source Heart Rate Source BP Location FiO2 (%) Pain Score    05/13/25 0700 05/13/25 0700 05/13/25 0700 -- 05/13/25 0700    Temporal Monitor Left arm  7      Vitals      Date and Time Temp Pulse SpO2 Resp BP Pain Score FACES Pain Rating User   05/13/25 0836 -- -- -- -- -- 7 --    05/13/25 0701 -- -- -- -- 138/80 -- -- SL   05/13/25 0700 98.6 °F (37 °C) 75 98 % 16 -- 7 --           Physical Exam  Vitals and nursing note reviewed.   Constitutional:       General: He is not in acute distress.     Appearance: He is not ill-appearing or toxic-appearing.   HENT:      Head: Normocephalic and atraumatic.      Right Ear: Tympanic membrane, ear canal and external ear normal.      Left Ear: Tympanic membrane, ear canal and external ear normal.      Nose: No congestion or rhinorrhea.      Mouth/Throat:      Mouth: Mucous membranes are moist.      Pharynx: Oropharynx is clear. Posterior oropharyngeal erythema present. No oropharyngeal exudate.     Eyes:      General: No scleral icterus.        Right eye: No discharge.         Left eye: No discharge.      Extraocular Movements: Extraocular movements intact.      Conjunctiva/sclera: Conjunctivae normal.        Cardiovascular:      Rate and Rhythm: Normal rate and regular rhythm.      Pulses: Normal pulses.      Heart sounds: Normal heart sounds. No murmur heard.  Pulmonary:      Effort: Pulmonary effort is normal. No respiratory distress.      Breath sounds: Normal breath sounds. No stridor. No wheezing, rhonchi or rales.   Chest:      Chest wall: No tenderness.   Abdominal:      General: Bowel sounds are normal. There is no distension.      Palpations: Abdomen is soft.      Tenderness: There is no abdominal tenderness. There is no guarding or rebound.     Musculoskeletal:         General: No tenderness.      Cervical back: Neck supple.      Right lower leg: No edema.      Left lower leg: No edema.     Skin:     General: Skin is warm and dry.      Coloration: Skin is not jaundiced or pale.      Findings: No erythema.     Neurological:      General: No focal deficit present.      Mental Status: He is alert and oriented to person, place, and time.     Psychiatric:         Mood and Affect: Mood normal.         Behavior: Behavior normal.       Results Reviewed       Procedure Component Value Units Date/Time    Strep A PCR [[id number]]  (Normal) Collected: 05/13/25 0939    Lab Status: Final result Specimen: Throat Updated: 05/13/25 1006     STREP A PCR Not Detected          XR chest 1 view portable   ED Interpretation by Raymond Muse DO (05/13 0945)   No focal infiltrates. No signs of acute cardiopulmonary disease.      Final Interpretation by Valerie Restrepo MD (05/13 1227)      No acute cardiopulmonary disease.            Workstation performed: [id number]             Procedures    ED Medication and Procedure Management   Prior to Admission Medications   Prescriptions Last Dose Informant Patient Reported? Taking?   Esomeprazole Magnesium (NexIUM 24HR) 20 MG TBEC   Yes No   Sig: Take by mouth   amoxicillin (AMOXIL) 500 mg capsule   No No   Sig: Take 1 capsule (500 mg total) by mouth every 8 (eight) hours for 10  days   fluticasone (FLONASE) 50 mcg/act nasal spray   No No   Si spray into each nostril daily   omeprazole (PriLOSEC) 20 mg delayed release capsule   Yes No   Sig: Take 20 mg by mouth daily   Patient not taking: Reported on 2025   ondansetron (ZOFRAN) 4 mg tablet   No No   Sig: Take 1 tablet (4 mg total) by mouth every 8 (eight) hours as needed for nausea or vomiting   Patient not taking: Reported on 2025      Facility-Administered Medications: None     Discharge Medication List as of 2025  9:47 AM        CONTINUE these medications which have NOT CHANGED    Details   amoxicillin (AMOXIL) 500 mg capsule Take 1 capsule (500 mg total) by mouth every 8 (eight) hours for 10 days, Starting Mon 2025, Until Thu 2025, Normal      Esomeprazole Magnesium (NexIUM 24HR) 20 MG TBEC Take by mouth, Historical Med      fluticasone (FLONASE) 50 mcg/act nasal spray 1 spray into each nostril daily, Starting Mon 2025, Normal      omeprazole (PriLOSEC) 20 mg delayed release capsule Take 20 mg by mouth daily, Historical Med      ondansetron (ZOFRAN) 4 mg tablet Take 1 tablet (4 mg total) by mouth every 8 (eight) hours as needed for nausea or vomiting, Starting Mon 2/10/2025, Normal           No discharge procedures on file.  ED SEPSIS DOCUMENTATION   Time reflects when diagnosis was documented in both MDM as applicable and the Disposition within this note       Time User Action Codes Description Comment    2025  9:45 AM Raymond Muse [B34.9] Viral syndrome                  Raymond Muse,   [phone]

## 2025-05-13 NOTE — Clinical Note
Federico Li was seen and treated in our emergency department on 5/13/2025.                Diagnosis:     Federico  .    He may return on this date:     Please excuse Federico Li from work on 5/13/2025.     If you have any questions or concerns, please don't hesitate to call.      Raymond Muse, DO    ______________________________           _______________          _______________  Hospital Representative                              Date                                Time

## 2025-05-13 NOTE — DISCHARGE INSTRUCTIONS
You will be contacted with the results of the strep swab.     The chest xray did not show signs of pneumonia.     For body aches/headache/neck pain, you can take ibuprofen 600 mg every 6 hours and Tylenol 1000 mg every 6 hours.  Drink plenty of clear/hydrating/electrolyte rich fluids over the next few days.  For cough, you can take Robitussin DM.    Return to the emergency department for any concerning signs or symptoms.

## 2025-05-21 ENCOUNTER — DOCUMENTATION (OUTPATIENT)
Dept: DERMATOLOGY | Facility: CLINIC | Age: 35
End: 2025-05-21

## 2025-05-21 ENCOUNTER — HOSPITAL ENCOUNTER (EMERGENCY)
Facility: HOSPITAL | Age: 35
Discharge: HOME | End: 2025-05-21
Attending: EMERGENCY MEDICINE
Payer: COMMERCIAL

## 2025-05-21 ENCOUNTER — TELEPHONE (OUTPATIENT)
Dept: DERMATOLOGY | Facility: CLINIC | Age: 35
End: 2025-05-21

## 2025-05-21 VITALS
SYSTOLIC BLOOD PRESSURE: 119 MMHG | HEIGHT: 67 IN | WEIGHT: 213.85 LBS | TEMPERATURE: 97.3 F | RESPIRATION RATE: 21 BRPM | BODY MASS INDEX: 33.56 KG/M2 | HEART RATE: 63 BPM | DIASTOLIC BLOOD PRESSURE: 74 MMHG | OXYGEN SATURATION: 98 %

## 2025-05-21 DIAGNOSIS — R21 RASH: Primary | ICD-10-CM

## 2025-05-21 LAB
ALBUMIN SERPL BCG-MCNC: 4 G/DL (ref 3.5–5)
ALP SERPL-CCNC: 97 U/L (ref 34–104)
ALT SERPL W P-5'-P-CCNC: 251 U/L (ref 7–52)
ANION GAP SERPL CALCULATED.3IONS-SCNC: 5 MMOL/L (ref 4–13)
AST SERPL W P-5'-P-CCNC: 109 U/L (ref 13–39)
BASOPHILS # BLD MANUAL: 0.16 THOUSAND/UL (ref 0–0.1)
BASOPHILS NFR MAR MANUAL: 1 % (ref 0–1)
BILIRUB SERPL-MCNC: 0.43 MG/DL (ref 0.2–1)
BUN SERPL-MCNC: 9 MG/DL (ref 5–25)
CALCIUM SERPL-MCNC: 8.6 MG/DL (ref 8.4–10.2)
CHLORIDE SERPL-SCNC: 106 MMOL/L (ref 96–108)
CO2 SERPL-SCNC: 27 MMOL/L (ref 21–32)
CREAT SERPL-MCNC: 0.89 MG/DL (ref 0.6–1.3)
CRP SERPL QL: 7.4 MG/L
EOSINOPHIL # BLD MANUAL: 0.33 THOUSAND/UL (ref 0–0.4)
EOSINOPHIL NFR BLD MANUAL: 2 % (ref 0–6)
ERYTHROCYTE [DISTWIDTH] IN BLOOD BY AUTOMATED COUNT: 13.2 % (ref 11.6–15.1)
ERYTHROCYTE [SEDIMENTATION RATE] IN BLOOD: 17 MM/HOUR (ref 0–14)
GFR SERPL CREATININE-BSD FRML MDRD: 110 ML/MIN/1.73SQ M
GLUCOSE SERPL-MCNC: 100 MG/DL (ref 65–140)
HBV SURFACE AB SER-ACNC: >500 MIU/ML
HBV SURFACE AG SER QL: NORMAL
HCT VFR BLD AUTO: 44.4 % (ref 36.5–49.3)
HCV AB SER QL: NORMAL
HGB BLD-MCNC: 14.4 G/DL (ref 12–17)
HIV 1+2 AB+HIV1 P24 AG SERPL QL IA: NORMAL
LYMPHOCYTES # BLD AUTO: 12.85 THOUSAND/UL (ref 0.6–4.47)
LYMPHOCYTES # BLD AUTO: 74 % (ref 14–44)
MCH RBC QN AUTO: 28 PG (ref 26.8–34.3)
MCHC RBC AUTO-ENTMCNC: 32.4 G/DL (ref 31.4–37.4)
MCV RBC AUTO: 86 FL (ref 82–98)
MONOCYTES # BLD AUTO: 1.3 THOUSAND/UL (ref 0–1.22)
MONOCYTES NFR BLD: 8 % (ref 4–12)
NEUTROPHILS # BLD MANUAL: 1.63 THOUSAND/UL (ref 1.85–7.62)
NEUTS SEG NFR BLD AUTO: 10 % (ref 43–75)
PLATELET # BLD AUTO: 192 THOUSANDS/UL (ref 149–390)
PLATELET BLD QL SMEAR: ADEQUATE
PMV BLD AUTO: 9.6 FL (ref 8.9–12.7)
POTASSIUM SERPL-SCNC: 3.8 MMOL/L (ref 3.5–5.3)
PROT SERPL-MCNC: 7 G/DL (ref 6.4–8.4)
RBC # BLD AUTO: 5.14 MILLION/UL (ref 3.88–5.62)
RBC MORPH BLD: NORMAL
SODIUM SERPL-SCNC: 138 MMOL/L (ref 135–147)
TREPONEMA PALLIDUM IGG+IGM AB [PRESENCE] IN SERUM OR PLASMA BY IMMUNOASSAY: NORMAL
VARIANT LYMPHS # BLD AUTO: 5 %
WBC # BLD AUTO: 16.27 THOUSAND/UL (ref 4.31–10.16)

## 2025-05-21 PROCEDURE — 96374 THER/PROPH/DIAG INJ IV PUSH: CPT

## 2025-05-21 PROCEDURE — 86140 C-REACTIVE PROTEIN: CPT | Performed by: EMERGENCY MEDICINE

## 2025-05-21 PROCEDURE — 96372 THER/PROPH/DIAG INJ SC/IM: CPT

## 2025-05-21 PROCEDURE — 87491 CHLMYD TRACH DNA AMP PROBE: CPT | Performed by: EMERGENCY MEDICINE

## 2025-05-21 PROCEDURE — 87389 HIV-1 AG W/HIV-1&-2 AB AG IA: CPT | Performed by: EMERGENCY MEDICINE

## 2025-05-21 PROCEDURE — 80053 COMPREHEN METABOLIC PANEL: CPT | Performed by: EMERGENCY MEDICINE

## 2025-05-21 PROCEDURE — 86780 TREPONEMA PALLIDUM: CPT | Performed by: EMERGENCY MEDICINE

## 2025-05-21 PROCEDURE — 85652 RBC SED RATE AUTOMATED: CPT | Performed by: EMERGENCY MEDICINE

## 2025-05-21 PROCEDURE — 99285 EMERGENCY DEPT VISIT HI MDM: CPT | Performed by: EMERGENCY MEDICINE

## 2025-05-21 PROCEDURE — 99283 EMERGENCY DEPT VISIT LOW MDM: CPT

## 2025-05-21 PROCEDURE — 86765 RUBEOLA ANTIBODY: CPT | Performed by: EMERGENCY MEDICINE

## 2025-05-21 PROCEDURE — 85007 BL SMEAR W/DIFF WBC COUNT: CPT | Performed by: EMERGENCY MEDICINE

## 2025-05-21 PROCEDURE — 87340 HEPATITIS B SURFACE AG IA: CPT | Performed by: EMERGENCY MEDICINE

## 2025-05-21 PROCEDURE — 36415 COLL VENOUS BLD VENIPUNCTURE: CPT | Performed by: EMERGENCY MEDICINE

## 2025-05-21 PROCEDURE — 86803 HEPATITIS C AB TEST: CPT | Performed by: EMERGENCY MEDICINE

## 2025-05-21 PROCEDURE — 86706 HEP B SURFACE ANTIBODY: CPT | Performed by: EMERGENCY MEDICINE

## 2025-05-21 PROCEDURE — 85027 COMPLETE CBC AUTOMATED: CPT | Performed by: EMERGENCY MEDICINE

## 2025-05-21 PROCEDURE — 96361 HYDRATE IV INFUSION ADD-ON: CPT

## 2025-05-21 PROCEDURE — 96375 TX/PRO/DX INJ NEW DRUG ADDON: CPT

## 2025-05-21 PROCEDURE — 87591 N.GONORRHOEAE DNA AMP PROB: CPT | Performed by: EMERGENCY MEDICINE

## 2025-05-21 RX ORDER — METHYLPREDNISOLONE 4 MG/1
TABLET ORAL
Qty: 21 TABLET | Refills: 0 | Status: SHIPPED | OUTPATIENT
Start: 2025-05-21

## 2025-05-21 RX ORDER — METHYLPREDNISOLONE SODIUM SUCCINATE 125 MG/2ML
125 INJECTION, POWDER, LYOPHILIZED, FOR SOLUTION INTRAMUSCULAR; INTRAVENOUS ONCE
Status: COMPLETED | OUTPATIENT
Start: 2025-05-21 | End: 2025-05-21

## 2025-05-21 RX ORDER — EPINEPHRINE 1 MG/ML
0.3 INJECTION, SOLUTION, CONCENTRATE INTRAVENOUS ONCE
Status: COMPLETED | OUTPATIENT
Start: 2025-05-21 | End: 2025-05-21

## 2025-05-21 RX ORDER — FAMOTIDINE 10 MG/ML
20 INJECTION, SOLUTION INTRAVENOUS ONCE
Status: COMPLETED | OUTPATIENT
Start: 2025-05-21 | End: 2025-05-21

## 2025-05-21 RX ORDER — DIPHENHYDRAMINE HYDROCHLORIDE 50 MG/ML
50 INJECTION, SOLUTION INTRAMUSCULAR; INTRAVENOUS ONCE
Status: COMPLETED | OUTPATIENT
Start: 2025-05-21 | End: 2025-05-21

## 2025-05-21 RX ADMIN — METHYLPREDNISOLONE SODIUM SUCCINATE 125 MG: 125 INJECTION, POWDER, FOR SOLUTION INTRAMUSCULAR; INTRAVENOUS at 07:09

## 2025-05-21 RX ADMIN — EPINEPHRINE 0.3 MG: 1 INJECTION, SOLUTION, CONCENTRATE INTRAVENOUS at 07:09

## 2025-05-21 RX ADMIN — SODIUM CHLORIDE 1000 ML: 0.9 INJECTION, SOLUTION INTRAVENOUS at 07:11

## 2025-05-21 RX ADMIN — DIPHENHYDRAMINE HYDROCHLORIDE 50 MG: 50 INJECTION, SOLUTION INTRAMUSCULAR; INTRAVENOUS at 07:08

## 2025-05-21 RX ADMIN — FAMOTIDINE 20 MG: 10 INJECTION, SOLUTION INTRAVENOUS at 07:09

## 2025-05-21 NOTE — PROGRESS NOTES
Attending and resident physician present VIRTUALLY. Patient photos reviewed. Patient in ED/hospital setting. Assessment can only be provided based on clinical images received and symptoms conveyed.      Patient is a 33 y/o who presents with 1-day rash on trunk, extremities, and neck. Patient had fever, chills, malaise, lymphadenopathy 8 days prior, at which time was given amoxicillin. Currently denies any symptoms other than the rash. ED suspected hives and gave the patient IM epinephrine and steroids without improvement. No outdoor activity. No facial edema or lymphadenopathy.     Given photos provided and symptoms reported, suspect viral exanthem vs morbilliform drug eruption 2/2 amoxicillin  vs less likely DRESS. Patient did have symptoms 1 week prior that are suspicious for EBV. Can obtain EBV titers. Less likely morbiliform drug eruption 2/2 amoxicillin; less likely DRESS (although CMP shows elevated AST/ALT, RegiSCAR score is -2 to -1; no case). Can repeat CMP in 1 week. Our schedulers will call to schedule an outpatient appointment in 1 week. Please return to nearest emergency room if rash significantly worsens, systemic symptoms develop, or develop facial edema, fever, chills, malaise, lymphadenopathy.

## 2025-05-21 NOTE — TELEPHONE ENCOUNTER
Attempted to reach patient 2xs unable to leave voicemail. Patient needs to be scheduled for a HFU on amb for next week. If patient calls please offer.

## 2025-05-21 NOTE — DISCHARGE INSTRUCTIONS
You were seen in the emergency department for rash, we discussed your case with dermatology, and they would like to see you in the office in 1 week at [facility].  We provided you with an ambulatory referral to their office.  You must follow-up with your primary care doctor in 24 to 48 hours for reassessment, and also to help facilitate your appointment, and repeat blood work.  We have performed tests for HIV, syphilis, EBV, and others.  Please call your primary care doctor's office and schedule your appointment for reassessment and for the results of these.  If your rash significantly worsens, or if you develop any chills, fevers, malaise, facial edema, lymphadenopathy, or other symptoms, please return to the emergency department immediately.  Your presentation may be due to a reaction to the amoxicillin you received.

## 2025-05-21 NOTE — ED PROVIDER NOTES
Time reflects when diagnosis was documented in both MDM as applicable and the Disposition within this note       Time User Action Codes Description Comment    5/21/2025 10:35 AM Brock Vásquez Add [R21] Rash           ED Disposition       ED Disposition   Discharge    Condition   Stable    Date/Time   Wed May 21, 2025 10:35 AM    Comment   Federico Li III discharge to home/self care.                   Assessment & Plan       Medical Decision Making  34-year-old male presents to the emergency department with complaint of diffuse rash, differential diagnosis include allergic reaction, viral syndrome, measles, syphilis, hepatitis, dress, HIV, erythema multiforme, contact dermatitis, pityriasis rosea, other infectious etiology, will perform lab work, provide patient with fluids, steroids, STD/STI check, and reassess.    Amount and/or Complexity of Data Reviewed  Labs: ordered.    Risk  Prescription drug management.        ED Course as of 05/21/25 1519   Wed May 21, 2025   1031 Discussed case with dermatology, they state that he was given amoxicillin on May 12.  It may be morbilliform drug eruption secondary to amoxicillin.  On his chart it looks like he complained on May 12 of LAD with his fevers and chills.  As such, it may have been an EBV infection.  Viral exanthem is also possible.  They recommend EBV titers at this time.  Hepatitis panel ordered with transaminitis seen on lab work.  Patient has a RegiSCAR score for DRESS is  -2/-1(no case of DRESS at this time). Please obtain differential to assess for eosinophil count. Can repeat CMP in 1 week and we can see him in clinic next week at [facility]. If rash significantly worsens, develops fevers, chills, malaise, facial edema, lymphadenopathy, or systemic symptoms, should return to nearest ED].  Discussed this plan with the patient.  Will discharge with strict return precautions.       Medications   diphenhydrAMINE (BENADRYL) injection 50 mg (50 mg Intravenous  Given 5/21/25 0708)   methylPREDNISolone sodium succinate (Solu-MEDROL) injection 125 mg (125 mg Intravenous Given 5/21/25 0709)   sodium chloride 0.9 % bolus 1,000 mL (0 mL Intravenous Stopped 5/21/25 0811)   Famotidine (PF) (PEPCID) injection 20 mg (20 mg Intravenous Given 5/21/25 0709)   EPINEPHrine PF (ADRENALIN) 1 mg/mL injection 0.3 mg (0.3 mg Intramuscular Given 5/21/25 0709)       ED Risk Strat Scores                    No data recorded                            History of Present Illness       Chief Complaint   Patient presents with    Rash     Rash that started yesterday around 1400 hours. Denies fever. +mildly itchy        No past medical history on file.   No past surgical history on file.   Family History   Problem Relation Name Age of Onset    No Known Problems Mother      Heart attack Father        Social History[1]   E-Cigarette/Vaping    E-Cigarette Use Current Every Day User       E-Cigarette/Vaping Substances    Nicotine Yes     THC No     CBD No     Flavoring Yes       I have reviewed and agree with the history as documented.     34-year-old male presents to the emergency department with diffuse rash all over neck, chest, back, abdomen, and on extremities, including small lesion on palms of hands.  Patient states that the symptoms started yesterday at about 4 PM, and then overnight significantly worsened.  Patient denies any tongue swelling, throat swelling, raspy voice, wheezing, coughing, nausea, vomiting, or abdominal pain.  Patient states that he was here about a week ago with what was called a viral syndrome.  Patient states his symptoms have resolved, now he broke out into a rash.  Patient denies any penile discharge, dysuria, or other lesions, however would like to be checked for HIV, syphilis, STI/STDs.  Patient denies new lotions, soaps, detergents, foods, or medications.      Rash  Associated symptoms: no abdominal pain, no fever, no joint pain, no shortness of breath, no sore throat  and not vomiting        Review of Systems   Constitutional:  Negative for chills and fever.   HENT:  Negative for ear pain and sore throat.    Eyes:  Negative for pain and visual disturbance.   Respiratory:  Negative for cough and shortness of breath.    Cardiovascular:  Negative for chest pain and palpitations.   Gastrointestinal:  Negative for abdominal pain and vomiting.   Genitourinary:  Negative for dysuria and hematuria.   Musculoskeletal:  Negative for arthralgias and back pain.   Skin:  Positive for rash. Negative for color change.   Neurological:  Negative for seizures and syncope.   All other systems reviewed and are negative.          Objective       ED Triage Vitals [05/21/25 0543]   Temperature Pulse Blood Pressure Respirations SpO2 Patient Position - Orthostatic VS   (!) 97.3 °F (36.3 °C) 87 149/98 16 98 % Lying      Temp Source Heart Rate Source BP Location FiO2 (%) Pain Score    Oral Monitor Right arm -- --      Vitals      Date and Time Temp Pulse SpO2 Resp BP Pain Score FACES Pain Rating User   05/21/25 1047 -- 63 98 % 21 119/74 -- --    05/21/25 1038 -- 56 96 % 18 110/63 -- --    05/21/25 1000 -- 67 93 % 18 121/64 -- --    05/21/25 0930 -- 65 96 % 19 122/63 -- --    05/21/25 0900 -- 73 96 % 21 127/59 -- --    05/21/25 0830 -- 74 97 % 21 127/63 -- --    05/21/25 0800 -- 76 97 % 20 131/60 -- --    05/21/25 0730 -- 59 98 % 19 124/76 -- --    05/21/25 0715 -- 70 99 % 17 114/56 -- --    05/21/25 0700 -- 68 99 % 18 137/74 -- --    05/21/25 0600 -- 75 96 % 16 129/81 -- --    05/21/25 0543 97.3 °F (36.3 °C) 87 98 % 16 149/98 -- -- BA            Physical Exam  Vitals and nursing note reviewed.   Constitutional:       General: He is not in acute distress.     Appearance: He is well-developed.   HENT:      Head: Normocephalic and atraumatic.     Eyes:      Conjunctiva/sclera: Conjunctivae normal.       Cardiovascular:      Rate and Rhythm: Normal rate and regular rhythm.   Pulmonary:       Effort: Pulmonary effort is normal. No respiratory distress.      Breath sounds: Normal breath sounds.   Abdominal:      Palpations: Abdomen is soft.      Tenderness: There is no abdominal tenderness.     Musculoskeletal:         General: No swelling.      Cervical back: Neck supple.     Skin:     General: Skin is warm and dry.      Capillary Refill: Capillary refill takes less than 2 seconds.      Findings: Rash present.     Neurological:      Mental Status: He is alert.     Psychiatric:         Mood and Affect: Mood normal.         Results Reviewed       Procedure Component Value Units Date/Time    HIV 1/2 AB/AG w Reflex [facility] for 2 yr old and above [[id number]]  (Normal) Collected: 05/21/25 0700    Lab Status: Final result Specimen: Blood from Arm, Left Updated: 05/21/25 1451     HIV AB/AG HT Interp Non-Reactive    Narrative:      This 4th generation HIV Ab/Ag HT assay is a chemiluminescent immunoassay for the simultaneous qualitative detection of HIV p24 antigen and antibodies to HIV-1 (Groups M and O) and HIV-2 in human serum. This assay cannot distinguish between the detection of HIV p24 antigen and HIV-1/HIV-2 antibodies. This test is validated for adult patients, including pregnant women, and pediatric patients as young as two years of age. The performance of this assay has not been established for individuals younger than 2 years of age.    RPR-Syphilis Screening (Total Syphilis IGG/IGM) [[id number]]  (Normal) Collected: 05/21/25 0700    Lab Status: Final result Specimen: Blood from Arm, Left Updated: 05/21/25 1451     Treponema Pallidium Total Antibodies Non-reactive    Hepatitis B surface antibody [[id number]] Collected: 05/21/25 0700    Lab Status: Final result Specimen: Blood from Arm, Left Updated: 05/21/25 1450     Hep B S Ab >500.00 mIU/mL     Hepatitis C antibody [[id number]]  (Normal) Collected: 05/21/25 0700    Lab Status: Final result Specimen: Blood from Arm, Left Updated: 05/21/25 1450      Hepatitis C Ab Non-reactive    Manual Differential(PHLEBS Do Not Order) [[id number]]  (Abnormal) Collected: 05/21/25 0700    Lab Status: Final result Specimen: Blood from Arm, Left Updated: 05/21/25 0855     Segmented % 10 %      Lymphocytes % 74 %      Monocytes % 8 %      Eosinophils % 2 %      Basophils % 1 %      Atypical Lymphocytes % 5 %      Absolute Neutrophils 1.63 Thousand/uL      Absolute Lymphocytes 12.85 Thousand/uL      Absolute Monocytes 1.30 Thousand/uL      Absolute Eosinophils 0.33 Thousand/uL      Absolute Basophils 0.16 Thousand/uL      Total Counted --     RBC Morphology Normal     Platelet Estimate Adequate    C-reactive protein [[id number]]  (Abnormal) Collected: 05/21/25 0700    Lab Status: Final result Specimen: Blood from Arm, Left Updated: 05/21/25 0833     CRP 7.4 mg/L     Sedimentation rate, automated [[id number]]  (Abnormal) Collected: 05/21/25 0700    Lab Status: Final result Specimen: Blood from Arm, Left Updated: 05/21/25 0819     Sed Rate 17 mm/hour     Comprehensive metabolic panel [[id number]]  (Abnormal) Collected: 05/21/25 0700    Lab Status: Final result Specimen: Blood from Arm, Left Updated: 05/21/25 0749     Sodium 138 mmol/L      Potassium 3.8 mmol/L      Chloride 106 mmol/L      CO2 27 mmol/L      ANION GAP 5 mmol/L      BUN 9 mg/dL      Creatinine 0.89 mg/dL      Glucose 100 mg/dL      Calcium 8.6 mg/dL       U/L       U/L      Alkaline Phosphatase 97 U/L      Total Protein 7.0 g/dL      Albumin 4.0 g/dL      Total Bilirubin 0.43 mg/dL      eGFR 110 ml/min/1.73sq m     Narrative:      National Kid[employer] Disease Foundation guidelines for Chronic Kidney Disease (CKD):     Stage 1 with normal or high GFR (GFR > 90 mL/min/1.73 square meters)    Stage 2 Mild CKD (GFR = 60-89 mL/min/1.73 square meters)    Stage 3A Moderate CKD (GFR = 45-59 mL/min/1.73 square meters)    Stage 3B Moderate CKD (GFR = 30-44 mL/min/1.73 square meters)    Stage 4 Severe CKD (GFR = 15-29  mL/min/1.73 square meters)    Stage 5 End Stage CKD (GFR <15 mL/min/1.73 square meters)  Note: GFR calculation is accurate only with a steady state creatinine    CBC and differential [[id number]]  (Abnormal) Collected: 25    Lab Status: Final result Specimen: Blood from Arm, Left Updated: 25     WBC 16.27 Thousand/uL      RBC 5.14 Million/uL      Hemoglobin 14.4 g/dL      Hematocrit 44.4 %      MCV 86 fL      MCH 28.0 pg      MCHC 32.4 g/dL      RDW 13.2 %      MPV 9.6 fL      Platelets 192 Thousands/uL     Chlamydia/GC amplified DNA by PCR [[id number]] Collected: 25    Lab Status: In process Specimen: Urine, Other Updated: 25    Hepatitis B surface antigen [[id number]] Collected: 25    Lab Status: In process Specimen: Blood from Arm, Left Updated: 25    Measles Antibodies (IgG,IgM) [[id number]] Collected: 25    Lab Status: In process Specimen: Blood from Arm, Left Updated: 25            No orders to display       Procedures    ED Medication and Procedure Management   Prior to Admission Medications   Prescriptions Last Dose Informant Patient Reported? Taking?   Esomeprazole Magnesium (NexIUM 24HR) 20 MG TBEC   Yes No   Sig: Take by mouth   amoxicillin (AMOXIL) 500 mg capsule   No No   Sig: Take 1 capsule (500 mg total) by mouth every 8 (eight) hours for 10 days   fluticasone (FLONASE) 50 mcg/act nasal spray   No No   Si spray into each nostril daily   omeprazole (PriLOSEC) 20 mg delayed release capsule   Yes No   Sig: Take 20 mg by mouth daily   Patient not taking: Reported on 2025   ondansetron (ZOFRAN) 4 mg tablet   No No   Sig: Take 1 tablet (4 mg total) by mouth every 8 (eight) hours as needed for nausea or vomiting   Patient not taking: Reported on 2025      Facility-Administered Medications: None     Discharge Medication List as of 2025 10:38 AM        CONTINUE these medications which have NOT CHANGED     Details   amoxicillin (AMOXIL) 500 mg capsule Take 1 capsule (500 mg total) by mouth every 8 (eight) hours for 10 days, Starting Mon 5/12/2025, Until Thu 5/22/2025, Normal      Esomeprazole Magnesium (NexIUM 24HR) 20 MG TBEC Take by mouth, Historical Med      fluticasone (FLONASE) 50 mcg/act nasal spray 1 spray into each nostril daily, Starting Mon 5/12/2025, Normal      omeprazole (PriLOSEC) 20 mg delayed release capsule Take 20 mg by mouth daily, Historical Med      ondansetron (ZOFRAN) 4 mg tablet Take 1 tablet (4 mg total) by mouth every 8 (eight) hours as needed for nausea or vomiting, Starting Mon 2/10/2025, Normal             ED SEPSIS DOCUMENTATION   Time reflects when diagnosis was documented in both MDM as applicable and the Disposition within this note       Time User Action Codes Description Comment    5/21/2025 10:35 AM Brock Vásquez Add [R21] Rash                    [1]   Social History  Tobacco Use    Smoking status: Never    Smokeless tobacco: Never   Vaping Use    Vaping status: Every Day    Substances: Nicotine, Flavoring   Substance Use Topics    Alcohol use: Not Currently    Drug use: Not Currently        Brock Vásquez DO  05/21/25 0038

## 2025-05-22 ENCOUNTER — TELEPHONE (OUTPATIENT)
Dept: DERMATOLOGY | Facility: CLINIC | Age: 35
End: 2025-05-22

## 2025-05-22 LAB
C TRACH DNA SPEC QL NAA+PROBE: NEGATIVE
N GONORRHOEA DNA SPEC QL NAA+PROBE: NEGATIVE

## 2025-05-22 NOTE — TELEPHONE ENCOUNTER
Attempted to reach PT a second time to sched hosp f/u in [facility] per in-basket mssgs (see 2 mssgs below):      Original in-basket mssg (sent 5/21/25):  Ave Simms MD  [facility] [place] [facility]  Hi, can we please schedule this patient for follow up in [facility] next week? Thanks    Second in-basket mssg (sent 5/22/25):  Unable to reach patient or lvm- please try again.       Both numbers in chart not accepting calls    Sent letter via [facility] for PT to call to sched apt

## 2025-05-24 LAB
MEV IGG SER IA-ACNC: >300 AU/ML
MEV IGM TITR SER IF: ABNORMAL TITER

## 2025-06-13 ENCOUNTER — HOSPITAL ENCOUNTER (EMERGENCY)
Facility: HOSPITAL | Age: 35
Discharge: HOME | End: 2025-06-13
Attending: EMERGENCY MEDICINE
Payer: COMMERCIAL

## 2025-06-13 VITALS
SYSTOLIC BLOOD PRESSURE: 140 MMHG | WEIGHT: 213 LBS | RESPIRATION RATE: 18 BRPM | HEART RATE: 68 BPM | BODY MASS INDEX: 33.36 KG/M2 | TEMPERATURE: 98.2 F | OXYGEN SATURATION: 97 % | DIASTOLIC BLOOD PRESSURE: 104 MMHG

## 2025-06-13 DIAGNOSIS — T16.2XXA FOREIGN BODY OF LEFT EAR, INITIAL ENCOUNTER: Primary | ICD-10-CM

## 2025-06-13 PROCEDURE — 99284 EMERGENCY DEPT VISIT MOD MDM: CPT | Performed by: EMERGENCY MEDICINE

## 2025-06-13 PROCEDURE — 99282 EMERGENCY DEPT VISIT SF MDM: CPT

## 2025-06-13 RX ORDER — OFLOXACIN 3 MG/ML
5 SOLUTION AURICULAR (OTIC) 2 TIMES DAILY
Qty: 5 ML | Refills: 0 | Status: SHIPPED | OUTPATIENT
Start: 2025-06-13

## 2025-06-13 RX ORDER — LIDOCAINE HYDROCHLORIDE 10 MG/ML
20 INJECTION, SOLUTION EPIDURAL; INFILTRATION; INTRACAUDAL; PERINEURAL ONCE
Status: COMPLETED | OUTPATIENT
Start: 2025-06-13 | End: 2025-06-13

## 2025-06-13 RX ADMIN — LIDOCAINE HYDROCHLORIDE 20 ML: 10 INJECTION, SOLUTION EPIDURAL; INFILTRATION; INTRACAUDAL; PERINEURAL at 16:59

## 2025-06-13 NOTE — ED PROVIDER NOTES
Time reflects when diagnosis was documented in both MDM as applicable and the Disposition within this note       Time User Action Codes Description Comment    6/13/2025  5:17 PM DevonBeny Add [T16.2XXA] Foreign body of left ear, initial encounter           ED Disposition       ED Disposition   Discharge    Condition   Stable    Date/Time   Fri Jun 13, 2025  5:17 PM    Comment   Federico Li III discharge to home/self care.                   Assessment & Plan       Medical Decision Making  Based on the history and medical screening exam performed the patient may be at risk for foreign body in ear.    Based on the work-up performed in the emergency room which includes physical examination, and which may include laboratory studies and imaging as warranted including advanced imaging such as CT scan or ultrasound, the diagnostic considerations are narrowed to exclude limb or life-threatening process.    The patient is stable for discharge.      Risk  Prescription drug management.             Medications   lidocaine (PF) (XYLOCAINE-MPF) 1 % injection 20 mL (20 mL Infiltration Given by Other 6/13/25 1659)       ED Risk Strat Scores                    No data recorded        SBIRT 20yo+      Flowsheet Row Most Recent Value   Initial Alcohol Screen: US AUDIT-C     1. How often do you have a drink containing alcohol? 0 Filed at: 06/13/2025 1648   2. How many drinks containing alcohol do you have on a typical day you are drinking?  0 Filed at: 06/13/2025 1648   3a. Male UNDER 65: How often do you have five or more drinks on one occasion? 0 Filed at: 06/13/2025 1648   Audit-C Score 0 Filed at: 06/13/2025 1648   CHRISTIANE: How many times in the past year have you...    Used an illegal drug or used a prescription medication for non-medical reasons? Never Filed at: 06/13/2025 1648                            History of Present Illness       Chief Complaint   Patient presents with    Foreign Body in Ear     Patient was  camping and woke up at 0100 screaming that something was in his R ear. Patient attempted to flush ear out without success. Patient took photo with ear extractor camera. Photo shows bug in R ear       Past Medical History[1]   Past Surgical History[2]   Family History[3]   Social History[4]   E-Cigarette/Vaping    E-Cigarette Use Current Every Day User       E-Cigarette/Vaping Substances    Nicotine Yes     THC No     CBD No     Flavoring Yes       I have reviewed and agree with the history as documented.     Woke up this morning at approximately 1 AM with foreign body sensation in ear.  Could feel something moving.  Felt it was an insect.  Came to the ED for removal.      History provided by:  Patient   used: No    Foreign Body in Ear  Location:  L ear  Suspected object:  Insect  Pain quality:  Aching  Pain severity:  Mild  Duration:  14 hours  Timing:  Constant  Progression:  Unchanged  Chronicity:  New  Worsened by:  Nothing  Ineffective treatments:  None tried  Associated symptoms: ear pain    Associated symptoms: no abdominal pain, no cough, no drooling, no hearing loss, no nausea, no nosebleeds, no sore throat, no trouble swallowing, no voice change and no vomiting        Review of Systems   Constitutional:  Negative for chills and fever.   HENT:  Positive for ear pain. Negative for drooling, hearing loss, nosebleeds, sore throat, trouble swallowing and voice change.    Eyes:  Negative for pain and discharge.   Respiratory:  Negative for cough, shortness of breath and wheezing.    Cardiovascular:  Negative for chest pain and palpitations.   Gastrointestinal:  Negative for abdominal pain, blood in stool, constipation, diarrhea, nausea and vomiting.   Genitourinary:  Negative for dysuria, flank pain, frequency and hematuria.   Musculoskeletal:  Negative for joint swelling, neck pain and neck stiffness.   Skin:  Negative for rash and wound.   Neurological:  Negative for dizziness, seizures,  syncope, facial asymmetry and headaches.   Psychiatric/Behavioral:  Negative for hallucinations, self-injury and suicidal ideas.    All other systems reviewed and are negative.          Objective       ED Triage Vitals [06/13/25 1648]   Temperature Pulse Blood Pressure Respirations SpO2 Patient Position - Orthostatic VS   98.2 °F (36.8 °C) 68 (!) 140/104 18 97 % Sitting      Temp Source Heart Rate Source BP Location FiO2 (%) Pain Score    Temporal Monitor Left arm -- No Pain      Vitals      Date and Time Temp Pulse SpO2 Resp BP Pain Score FACES Pain Rating User   06/13/25 1648 98.2 °F (36.8 °C) 68 97 % 18 140/104 No Pain -- SL            Physical Exam  Vitals and nursing note reviewed.   Constitutional:       General: He is not in acute distress.     Appearance: Normal appearance. He is well-developed. He is not ill-appearing or diaphoretic.   HENT:      Head: Normocephalic and atraumatic.      Right Ear: External ear normal.      Left Ear: External ear normal.      Ears:      Comments: Insect visualized in the external auditory canal    Eyes:      General: No scleral icterus.        Right eye: No discharge.         Left eye: No discharge.      Extraocular Movements: Extraocular movements intact.      Conjunctiva/sclera: Conjunctivae normal.     Pulmonary:      Effort: Pulmonary effort is normal. No respiratory distress.     Musculoskeletal:         General: No swelling or deformity. Normal range of motion.      Cervical back: Normal range of motion and neck supple.     Skin:     General: Skin is dry.      Coloration: Skin is not jaundiced or pale.      Findings: No rash.     Neurological:      General: No focal deficit present.      Mental Status: He is alert and oriented to person, place, and time.      Cranial Nerves: No cranial nerve deficit.      Motor: No weakness.      Coordination: Coordination normal.      Gait: Gait normal.     Psychiatric:         Mood and Affect: Mood normal.         Behavior: Behavior  normal.         Thought Content: Thought content normal.         Judgment: Judgment normal.         Results Reviewed       None            No orders to display       Foreign body removal    Date/Time: 2025 5:15 PM    Performed by: Beny Osorio MD  Authorized by: Beny Osorio MD    Patient location:  ED  Procedure Detail:     Procedure note (site, laterality, method, findings):  Following informed verbal consent from patient, the insect was visualized using the otoscope.  With the assistance of Dr. Trevino the insect was removed from the auditory canal using the alligator forceps by direct visualization gripping and traction.  Post-procedure details:     Patient tolerance of procedure:  Tolerated well, no immediate complications      ED Medication and Procedure Management   Prior to Admission Medications   Prescriptions Last Dose Informant Patient Reported? Taking?   Esomeprazole Magnesium (NexIUM 24HR) 20 MG TBEC   Yes No   Sig: Take by mouth   fluticasone (FLONASE) 50 mcg/act nasal spray   No No   Si spray into each nostril daily   methylPREDNISolone 4 MG tablet therapy pack   No No   Sig: Use as directed on package   omeprazole (PriLOSEC) 20 mg delayed release capsule   Yes No   Sig: Take 20 mg by mouth daily   Patient not taking: Reported on 2025   ondansetron (ZOFRAN) 4 mg tablet   No No   Sig: Take 1 tablet (4 mg total) by mouth every 8 (eight) hours as needed for nausea or vomiting   Patient not taking: Reported on 2025      Facility-Administered Medications: None     Discharge Medication List as of 2025  5:20 PM        START taking these medications    Details   ofloxacin (FLOXIN) 0.3 % otic solution Administer 5 drops into ears 2 (two) times a day, Starting Fri 2025, Normal           CONTINUE these medications which have NOT CHANGED    Details   Esomeprazole Magnesium (NexIUM 24HR) 20 MG TBEC Take by mouth, Historical Med      fluticasone (FLONASE) 50 mcg/act nasal  spray 1 spray into each nostril daily, Starting Mon 5/12/2025, Normal      methylPREDNISolone 4 MG tablet therapy pack Use as directed on package, Normal      omeprazole (PriLOSEC) 20 mg delayed release capsule Take 20 mg by mouth daily, Historical Med      ondansetron (ZOFRAN) 4 mg tablet Take 1 tablet (4 mg total) by mouth every 8 (eight) hours as needed for nausea or vomiting, Starting Mon 2/10/2025, Normal           No discharge procedures on file.  ED SEPSIS DOCUMENTATION   Time reflects when diagnosis was documented in both MDM as applicable and the Disposition within this note       Time User Action Codes Description Comment    6/13/2025  5:17 PM Beny Osorio Add [T16.2XXA] Foreign body of left ear, initial encounter                    [1] No past medical history on file.  [2] No past surgical history on file.  [3]   Family History  Problem Relation Name Age of Onset    No Known Problems Mother      Heart attack Father     [4]   Social History  Tobacco Use    Smoking status: Never    Smokeless tobacco: Never   Vaping Use    Vaping status: Every Day    Substances: Nicotine, Flavoring   Substance Use Topics    Alcohol use: Not Currently    Drug use: Not Currently        Beny Osorio MD  06/13/25 1446